# Patient Record
(demographics unavailable — no encounter records)

---

## 2024-10-09 NOTE — HISTORY OF PRESENT ILLNESS
[FreeTextEntry1] : Patient is a 58yo F who presents today for initial evaluation of breast cancer surveillance. Patient states she is an old pt of mine (no documentation in [facility]). She has hx of R TM in 2010 (age 45) w/ Dr. RYAN and Dr. Ramirez for unspecified breast cancer. S/p RT, chemotherapy, and Tamoxifen x10 years (w/ Dr. Helms). She has fhx of breast cancer in three paternal cousins. Patient is BRCA negative (tested 2010, no report on file), patient previously declined updated genetic testing. Patient denies palpable masses or skin changes bilaterally. Of note, patient previously met with Dr. Ramirez to discuss possible implant exchange.  Of note, patient had been recommended to undergo high rescreening MRI w/ correlate to outside prior MG/US from 4L dense breast tissue and 2 cm axillary LN 9/20/2023 however, due to out-of-pocket cost to patient not performed.  Reviewed and recommended repeat L MG/US at [facility] that yielded BI-RADS 2 report 11/28/2023.  3/2/21: L MG & US (Rubén Rodriguez MD)- extremely dense. US- L 1.4cm LN. BI-RADS 1 3/29/23: L MG (Rubén Rodriguez MD)- extremely dense. L stable 0.5cm asymmetry UOQ. Rec US. BI-RADS 1 9/20/23: L MG & US (Rubén Rodriguez MD)- extremely dense. L 0.5cm stable asymmetry UOQ. US- R 1.4cm LN. L 2.7cm dense patch 3:00 2FN.  2.5cm dense patch 5:00 4FN. L 2.9cm dense patch 6:00 2FN. L 2.0cm LN. Rec 6m f/u MG/US. BI-RADS 3 11/28/23: L MG & US- heterogeneously dense. L stable LN. US- L 0.5cm LN 2:00 8FN. L 2.2cm stable likely hamartoma 3:00 2FN. L 2.9cm stable likely hamartoma 5:00 4FN. L 2.7cm stable dense tissue 6:00 2FN. L normal appearing LN. BI-RADS 2. 10/16/2024: L MG/US: To be scheduled

## 2024-10-09 NOTE — PHYSICAL EXAM
[de-identified] : R chest wall/axilla/supraclavicular area: No evidence of recurrence [de-identified] : L breast/axilla/supraclavicular area: No masses, discharge, or adenopathy

## 2024-12-26 NOTE — CARDIOLOGY SUMMARY
[LVEF ___%] : LVEF [unfilled]% [___] : [unfilled] [___] : [unfilled] [de-identified] : 12/18/2024: Sinus Rhythm at 64 beats per minute with low voltage in precordial leads, and poor R wave progression [de-identified] : Exercise Nuclear Stress Test performed on 6/8/2022: Good exercise capacity (9 METS). No ischemic ECG changes. LVEF > 70%, revealing overall preserved left ventricular ejection fraction with apical hypokinesis. Small, mild defects of the distal anterior and apical walls that are reversible, suggestive of mild ischemia.  [de-identified] : 7/8/2024: Technically difficult image quality. Endocardium not well visualized; grossly preserved left ventricular ejection fraction. EF is approximately 65%. Reversal of the E/A waves of the mitral inflow pattern consistent with reduced compliance of the left ventricle. No left ventricular hypertrophy. Normal right ventricular cavity size and normal right ventricular systolic function. Mild mitral regurgitation. Mitral annular calcification with thickened mitral valve leaflets. Trace tricuspid regurgitation. Trace aortic regurgitation. Trileaflet aortic valve with normal systolic excursion. There is calcification of the aortic valve leaflets.   2/9/2022: Mild aortic stenosis with an RAVI= 2 sqcm. Endocardium not well visualized; grossly preserved left ventricular ejection fraction. EF is approximately 60%. Minimal tricuspid regurgitation. No pulmonary HTN. PASP= 21 mmHg. Possible small pericardial effusion.  [de-identified] : 3/7/2023: Carotid Doppler 1. Right ICA 16-49%. There is mild calcified plaque. 2. Left ICA 1-15%. There is mild heterogenous plaque 3. Incidentally noted right thyroid cyst and multiple cysts on the left. Consider dedicated thyroid ultrasound to evaluate.

## 2024-12-26 NOTE — CARDIOLOGY SUMMARY
[LVEF ___%] : LVEF [unfilled]% [___] : [unfilled] [___] : [unfilled] [de-identified] : 12/18/2024: Sinus Rhythm at 64 beats per minute with low voltage in precordial leads, and poor R wave progression [de-identified] : Exercise Nuclear Stress Test performed on 6/8/2022: Good exercise capacity (9 METS). No ischemic ECG changes. LVEF > 70%, revealing overall preserved left ventricular ejection fraction with apical hypokinesis. Small, mild defects of the distal anterior and apical walls that are reversible, suggestive of mild ischemia.  [de-identified] : 7/8/2024: Technically difficult image quality. Endocardium not well visualized; grossly preserved left ventricular ejection fraction. EF is approximately 65%. Reversal of the E/A waves of the mitral inflow pattern consistent with reduced compliance of the left ventricle. No left ventricular hypertrophy. Normal right ventricular cavity size and normal right ventricular systolic function. Mild mitral regurgitation. Mitral annular calcification with thickened mitral valve leaflets. Trace tricuspid regurgitation. Trace aortic regurgitation. Trileaflet aortic valve with normal systolic excursion. There is calcification of the aortic valve leaflets.   2/9/2022: Mild aortic stenosis with an RAVI= 2 sqcm. Endocardium not well visualized; grossly preserved left ventricular ejection fraction. EF is approximately 60%. Minimal tricuspid regurgitation. No pulmonary HTN. PASP= 21 mmHg. Possible small pericardial effusion.  [de-identified] : 3/7/2023: Carotid Doppler 1. Right ICA 16-49%. There is mild calcified plaque. 2. Left ICA 1-15%. There is mild heterogenous plaque 3. Incidentally noted right thyroid cyst and multiple cysts on the left. Consider dedicated thyroid ultrasound to evaluate.

## 2024-12-26 NOTE — CARDIOLOGY SUMMARY
[LVEF ___%] : LVEF [unfilled]% [___] : [unfilled] [___] : [unfilled] [de-identified] : 12/18/2024: Sinus Rhythm at 64 beats per minute with low voltage in precordial leads, and poor R wave progression [de-identified] : Exercise Nuclear Stress Test performed on 6/8/2022: Good exercise capacity (9 METS). No ischemic ECG changes. LVEF > 70%, revealing overall preserved left ventricular ejection fraction with apical hypokinesis. Small, mild defects of the distal anterior and apical walls that are reversible, suggestive of mild ischemia.  [de-identified] : 7/8/2024: Technically difficult image quality. Endocardium not well visualized; grossly preserved left ventricular ejection fraction. EF is approximately 65%. Reversal of the E/A waves of the mitral inflow pattern consistent with reduced compliance of the left ventricle. No left ventricular hypertrophy. Normal right ventricular cavity size and normal right ventricular systolic function. Mild mitral regurgitation. Mitral annular calcification with thickened mitral valve leaflets. Trace tricuspid regurgitation. Trace aortic regurgitation. Trileaflet aortic valve with normal systolic excursion. There is calcification of the aortic valve leaflets.   2/9/2022: Mild aortic stenosis with an RAVI= 2 sqcm. Endocardium not well visualized; grossly preserved left ventricular ejection fraction. EF is approximately 60%. Minimal tricuspid regurgitation. No pulmonary HTN. PASP= 21 mmHg. Possible small pericardial effusion.  [de-identified] : 3/7/2023: Carotid Doppler 1. Right ICA 16-49%. There is mild calcified plaque. 2. Left ICA 1-15%. There is mild heterogenous plaque 3. Incidentally noted right thyroid cyst and multiple cysts on the left. Consider dedicated thyroid ultrasound to evaluate.

## 2024-12-26 NOTE — HISTORY OF PRESENT ILLNESS
[FreeTextEntry1] : Patient is a 59 year old woman with a history of HTN, coronary atherosclerosis, tobacco use, family history of CAD, hyperlipidemia, prior COVID infection, and right-sided breast cancer status post surgery with reconstruction who presents today for follow up of hyperlipidemia. She states that she has been feeling relatively well from the cardiac standpoint denying any chest pain, shortness of breath, palpitations, headaches, and dizziness. She has been watching her diet and has lost weight.

## 2024-12-26 NOTE — DISCUSSION/SUMMARY
[FreeTextEntry1] : IMPRESSION: Ms. AHSBY is a 59 year -old woman with a history of HTN, coronary atherosclerosis, tobacco use, family history of CAD, hyperlipidemia, prior COVID infection, and right-sided breast cancer status post surgery with reconstruction who presents today for follow up of hyperlipidemia.   PLAN: 1. She will have a CMP and lipid profile checked today. She will continue on Crestor 10 mg daily given her hyperlipidemia. Her goal LDL is less than 70.  She will continue on ASA 81 mg daily given her coronary atherosclerosis. She needs good control of her cholesterol as well given her coronary atherosclerosis.  2. Her blood pressure is well controlled. Given that her blood pressure is very good (if not on the lower side), she will decrease her Toprol XL to 12.5 mg daily. 3. She understands the importance of tobacco cessation. 4. She was in sinus rhythm on her ECG that was performed today with poor R wave progression. 5. She will follow up with me in 4 months or sooner should she experience any symptoms in the interim.  [EKG obtained to assist in diagnosis and management of assessed problem(s)] : EKG obtained to assist in diagnosis and management of assessed problem(s)

## 2024-12-26 NOTE — DISCUSSION/SUMMARY
[FreeTextEntry1] : IMPRESSION: Ms. ASHBY is a 59 year -old woman with a history of HTN, coronary atherosclerosis, tobacco use, family history of CAD, hyperlipidemia, prior COVID infection, and right-sided breast cancer status post surgery with reconstruction who presents today for follow up of hyperlipidemia.   PLAN: 1. She will have a CMP and lipid profile checked today. She will continue on Crestor 10 mg daily given her hyperlipidemia. Her goal LDL is less than 70.  She will continue on ASA 81 mg daily given her coronary atherosclerosis. She needs good control of her cholesterol as well given her coronary atherosclerosis.  2. Her blood pressure is well controlled. Given that her blood pressure is very good (if not on the lower side), she will decrease her Toprol XL to 12.5 mg daily. 3. She understands the importance of tobacco cessation. 4. She was in sinus rhythm on her ECG that was performed today with poor R wave progression. 5. She will follow up with me in 4 months or sooner should she experience any symptoms in the interim.  [EKG obtained to assist in diagnosis and management of assessed problem(s)] : EKG obtained to assist in diagnosis and management of assessed problem(s)

## 2025-02-10 NOTE — HISTORY OF PRESENT ILLNESS
[FreeTextEntry1] : Very pleasant 59 year old woman who presents for evaluation of microscopic hematuria found on urinalysis 12/2024 to 4 RBC/hpf. She reports no history of gross hematuria.  No flank pain. No suprapubic pain. No dysuria.  No urinary frequency, urgency. No history of urinary tract infections or renal impairment. No aggravating or alleviating factors that she knows of contributing to hematuria.  No family history of  malignancy.  No family history of nephrolithiasis.  She smokes cigarettes every day and has done so for many years.

## 2025-02-10 NOTE — ASSESSMENT
[FreeTextEntry1] : Very pleasant 59-year-old female with microscopic hematuria -urinalysis demonstrates 4 red blood cells per high-powered field -urine culture -urine cytology -CT without contrast.  We discussed the recommendation to proceed with a CT urogram (with and without contrast), however she does not wish to undergo contrast administration.  We discussed the superior diagnostic accuracy of a CT scan with contrast, however she wishes to forego this. -cystoscopy -We discussed AUA guidelines regarding risk stratification for microscopic hematuria -Extensive discussion of the potential etiologies of hematuria, as well as the need to complete full work up for evaluation of cancer or other  sources of hematuria.  Patient understands and wishes to proceed. -We discussed that her smoking history is concerning for possible malignancy.  Patient is being seen today for evaluation and management of a chronic and longitudinal ongoing condition and I am the primary treating physician
[FreeTextEntry1] : Very pleasant 59-year-old female with microscopic hematuria -urinalysis demonstrates 4 red blood cells per high-powered field -urine culture -urine cytology -CT without contrast.  We discussed the recommendation to proceed with a CT urogram (with and without contrast), however she does not wish to undergo contrast administration.  We discussed the superior diagnostic accuracy of a CT scan with contrast, however she wishes to forego this. -cystoscopy -We discussed AUA guidelines regarding risk stratification for microscopic hematuria -Extensive discussion of the potential etiologies of hematuria, as well as the need to complete full work up for evaluation of cancer or other  sources of hematuria.  Patient understands and wishes to proceed. -We discussed that her smoking history is concerning for possible malignancy.  Patient is being seen today for evaluation and management of a chronic and longitudinal ongoing condition and I am the primary treating physician
FAMILY HISTORY:  No significant family history    Grandparent  Still living? Unknown  Family history of stroke, Age at diagnosis: Age Unknown

## 2025-04-09 NOTE — CARDIOLOGY SUMMARY
[de-identified] : 4/9/2025: Sinus Bradycardia at 59 beats with low voltage QRS, and left atrial enlargement [de-identified] : Exercise Nuclear Stress Test performed on 6/8/2022: Good exercise capacity (9 METS). No ischemic ECG changes. LVEF > 70%, revealing overall preserved left ventricular ejection fraction with apical hypokinesis. Small, mild defects of the distal anterior and apical walls that are reversible, suggestive of mild ischemia.  [de-identified] : 7/8/2024: Technically difficult image quality. Endocardium not well visualized; grossly preserved left ventricular ejection fraction. EF is approximately 65%. Reversal of the E/A waves of the mitral inflow pattern consistent with reduced compliance of the left ventricle. No left ventricular hypertrophy. Normal right ventricular cavity size and normal right ventricular systolic function. Mild mitral regurgitation. Mitral annular calcification with thickened mitral valve leaflets. Trace tricuspid regurgitation. Trace aortic regurgitation. Trileaflet aortic valve with normal systolic excursion. There is calcification of the aortic valve leaflets.   2/9/2022: Mild aortic stenosis with an RAVI= 2 sqcm. Endocardium not well visualized; grossly preserved left ventricular ejection fraction. EF is approximately 60%. Minimal tricuspid regurgitation. No pulmonary HTN. PASP= 21 mmHg. Possible small pericardial effusion.  [de-identified] : 3/7/2023: Carotid Doppler 1. Right ICA 16-49%. There is mild calcified plaque. 2. Left ICA 1-15%. There is mild heterogenous plaque 3. Incidentally noted right thyroid cyst and multiple cysts on the left. Consider dedicated thyroid ultrasound to evaluate.

## 2025-04-09 NOTE — CARDIOLOGY SUMMARY
[de-identified] : 4/9/2025: Sinus Bradycardia at 59 beats with low voltage QRS, and left atrial enlargement [de-identified] : Exercise Nuclear Stress Test performed on 6/8/2022: Good exercise capacity (9 METS). No ischemic ECG changes. LVEF > 70%, revealing overall preserved left ventricular ejection fraction with apical hypokinesis. Small, mild defects of the distal anterior and apical walls that are reversible, suggestive of mild ischemia.  [de-identified] : 7/8/2024: Technically difficult image quality. Endocardium not well visualized; grossly preserved left ventricular ejection fraction. EF is approximately 65%. Reversal of the E/A waves of the mitral inflow pattern consistent with reduced compliance of the left ventricle. No left ventricular hypertrophy. Normal right ventricular cavity size and normal right ventricular systolic function. Mild mitral regurgitation. Mitral annular calcification with thickened mitral valve leaflets. Trace tricuspid regurgitation. Trace aortic regurgitation. Trileaflet aortic valve with normal systolic excursion. There is calcification of the aortic valve leaflets.   2/9/2022: Mild aortic stenosis with an RAVI= 2 sqcm. Endocardium not well visualized; grossly preserved left ventricular ejection fraction. EF is approximately 60%. Minimal tricuspid regurgitation. No pulmonary HTN. PASP= 21 mmHg. Possible small pericardial effusion.  [de-identified] : 3/7/2023: Carotid Doppler 1. Right ICA 16-49%. There is mild calcified plaque. 2. Left ICA 1-15%. There is mild heterogenous plaque 3. Incidentally noted right thyroid cyst and multiple cysts on the left. Consider dedicated thyroid ultrasound to evaluate.

## 2025-04-09 NOTE — DISCUSSION/SUMMARY
[EKG obtained to assist in diagnosis and management of assessed problem(s)] : EKG obtained to assist in diagnosis and management of assessed problem(s) [FreeTextEntry1] : IMPRESSION: Ms. ASHBY is a 60 year -old woman with a history of HTN, coronary atherosclerosis, tobacco use, family history of CAD, hyperlipidemia, prior COVID infection, and right-sided breast cancer status post surgery with reconstruction who presents today for follow up of hyperlipidemia.   PLAN: 1. She will have a CMP and lipid profile checked today. She will continue on Crestor 20 mg daily given her hyperlipidemia. Her goal LDL is less than 70.  She will continue on ASA 81 mg daily given her coronary atherosclerosis. She needs good control of her cholesterol as well given her coronary atherosclerosis.  2. Her blood pressure is well controlled. Given that her blood pressure is very good (if not on the lower side) with mild bradycardia on her ECG, she will change her Toprol XL to 12.5 mg every other day. 3. She understands the importance of tobacco cessation. 4. She was in sinus bradycardia on her ECG that was performed today. 5. She will follow up with me in 4 months or sooner should she experience any symptoms in the interim.  6. She will follow up with Endocrine given her abnormal TSH.

## 2025-04-09 NOTE — HISTORY OF PRESENT ILLNESS
[FreeTextEntry1] : Patient is a 60 year old woman with a history of HTN, coronary atherosclerosis, tobacco use, family history of CAD, hyperlipidemia, microscopic hematuria, prior COVID infection, and right-sided breast cancer status post surgery with reconstruction who presents today for follow up of hyperlipidemia. She states that she has been feeling relatively well from the cardiac standpoint denying any chest pain, shortness of breath, palpitations, headaches, and dizziness. She has been watching her diet and has lost weight.

## 2025-07-23 NOTE — CARDIOLOGY SUMMARY
[LVEF ___%] : LVEF [unfilled]% [___] : [unfilled] [de-identified] : 7/23/2025: Sinus Rhythm at 62 with low voltage QRS, and poor R wave progression [de-identified] : Exercise Nuclear Stress Test performed on 6/8/2022: Good exercise capacity (9 METS). No ischemic ECG changes. LVEF > 70%, revealing overall preserved left ventricular ejection fraction with apical hypokinesis. Small, mild defects of the distal anterior and apical walls that are reversible, suggestive of mild ischemia.  [de-identified] : 7/8/2024: Technically difficult image quality. Endocardium not well visualized; grossly preserved left ventricular ejection fraction. EF is approximately 65%. Reversal of the E/A waves of the mitral inflow pattern consistent with reduced compliance of the left ventricle. No left ventricular hypertrophy. Normal right ventricular cavity size and normal right ventricular systolic function. Mild mitral regurgitation. Mitral annular calcification with thickened mitral valve leaflets. Trace tricuspid regurgitation. Trace aortic regurgitation. Trileaflet aortic valve with normal systolic excursion. There is calcification of the aortic valve leaflets.   2/9/2022: Mild aortic stenosis with an RAVI= 2 sqcm. Endocardium not well visualized; grossly preserved left ventricular ejection fraction. EF is approximately 60%. Minimal tricuspid regurgitation. No pulmonary HTN. PASP= 21 mmHg. Possible small pericardial effusion.  [de-identified] : Calcium score performed on 4/26/2025: Calcium score of 0. [de-identified] : 3/7/2023: Carotid Doppler 1. Right ICA 16-49%. There is mild calcified plaque. 2. Left ICA 1-15%. There is mild heterogenous plaque 3. Incidentally noted right thyroid cyst and multiple cysts on the left. Consider dedicated thyroid ultrasound to evaluate.

## 2025-07-23 NOTE — DISCUSSION/SUMMARY
[FreeTextEntry1] : IMPRESSION: Ms. ASHBY is a 60 year -old woman with a history of HTN, coronary atherosclerosis, tobacco use, family history of CAD, hyperlipidemia, prior COVID infection, and right-sided breast cancer status post surgery with reconstruction who presents today for follow up of hyperlipidemia.   PLAN: 1. She will have a CMP and lipid profile checked today. She will continue on Crestor 20 mg daily given her hyperlipidemia. Her goal LDL is less than 100.  She had a calcium score  April 2025. She will continue on ASA 81 mg daily given her coronary atherosclerosis. She needs good control of her cholesterol. 2. Her blood pressure is on the lower side. I have asked her to increase her water intake. She will continue on Toprol XL 12.5 mg every other day. She was in sinus rhythm on her ECG that was performed today.  3. She understands the importance of tobacco cessation. 4. She will follow up with me in 4 months or sooner should she experience any symptoms in the interim.  5. She will schedule a carotid Doppler given her headaches and lightheadedness.  6. She will follow up with Endocrine given her thyroid abnormalities.

## 2025-07-23 NOTE — HISTORY OF PRESENT ILLNESS
[FreeTextEntry1] : Patient is a 60 year old woman with a history of HTN, coronary atherosclerosis, tobacco use, family history of CAD, hyperlipidemia, microscopic hematuria, prior COVID infection, and right-sided breast cancer status post surgery with reconstruction who presents today for follow up of hyperlipidemia. She states that she has been feeling relatively well from the cardiac standpoint denying any chest pain, shortness of breath, palpitations, and headaches. She has been watching her diet and has lost weight. She has been compliant with the new dosage of metoprolol at 12.5mg every other day. She reports 1-2 occasions where she would get up too quickly and feel lightheaded but it would self-resolve. 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

## 2025-07-23 NOTE — CARDIOLOGY SUMMARY
[LVEF ___%] : LVEF [unfilled]% [___] : [unfilled] [de-identified] : 7/23/2025: Sinus Rhythm at 62 with low voltage QRS, and poor R wave progression [de-identified] : Exercise Nuclear Stress Test performed on 6/8/2022: Good exercise capacity (9 METS). No ischemic ECG changes. LVEF > 70%, revealing overall preserved left ventricular ejection fraction with apical hypokinesis. Small, mild defects of the distal anterior and apical walls that are reversible, suggestive of mild ischemia.  [de-identified] : 7/8/2024: Technically difficult image quality. Endocardium not well visualized; grossly preserved left ventricular ejection fraction. EF is approximately 65%. Reversal of the E/A waves of the mitral inflow pattern consistent with reduced compliance of the left ventricle. No left ventricular hypertrophy. Normal right ventricular cavity size and normal right ventricular systolic function. Mild mitral regurgitation. Mitral annular calcification with thickened mitral valve leaflets. Trace tricuspid regurgitation. Trace aortic regurgitation. Trileaflet aortic valve with normal systolic excursion. There is calcification of the aortic valve leaflets.   2/9/2022: Mild aortic stenosis with an RAVI= 2 sqcm. Endocardium not well visualized; grossly preserved left ventricular ejection fraction. EF is approximately 60%. Minimal tricuspid regurgitation. No pulmonary HTN. PASP= 21 mmHg. Possible small pericardial effusion.  [de-identified] : Calcium score performed on 4/26/2025: Calcium score of 0. [de-identified] : 3/7/2023: Carotid Doppler 1. Right ICA 16-49%. There is mild calcified plaque. 2. Left ICA 1-15%. There is mild heterogenous plaque 3. Incidentally noted right thyroid cyst and multiple cysts on the left. Consider dedicated thyroid ultrasound to evaluate.